# Patient Record
Sex: MALE | Race: WHITE | Employment: PART TIME | URBAN - METROPOLITAN AREA
[De-identification: names, ages, dates, MRNs, and addresses within clinical notes are randomized per-mention and may not be internally consistent; named-entity substitution may affect disease eponyms.]

---

## 2023-06-06 ENCOUNTER — HOSPITAL ENCOUNTER (EMERGENCY)
Facility: HOSPITAL | Age: 33
Discharge: ANOTHER HOSPITAL | End: 2023-06-07
Attending: STUDENT IN AN ORGANIZED HEALTH CARE EDUCATION/TRAINING PROGRAM

## 2023-06-06 ENCOUNTER — APPOINTMENT (OUTPATIENT)
Facility: HOSPITAL | Age: 33
End: 2023-06-06

## 2023-06-06 DIAGNOSIS — R56.9 SEIZURE (HCC): Primary | ICD-10-CM

## 2023-06-06 DIAGNOSIS — R45.851 SUICIDAL IDEATION: ICD-10-CM

## 2023-06-06 DIAGNOSIS — F10.920 ALCOHOLIC INTOXICATION WITHOUT COMPLICATION (HCC): ICD-10-CM

## 2023-06-06 LAB
ALBUMIN SERPL-MCNC: 4.4 G/DL (ref 3.5–5)
ALBUMIN/GLOB SERPL: 1 (ref 1.1–2.2)
ALP SERPL-CCNC: 56 U/L (ref 45–117)
ALT SERPL-CCNC: 87 U/L (ref 12–78)
ANION GAP BLD CALC-SCNC: 7 (ref 10–20)
ANION GAP SERPL CALC-SCNC: 13 MMOL/L (ref 5–15)
AST SERPL-CCNC: 75 U/L (ref 15–37)
BASE EXCESS BLD CALC-SCNC: 0.8 MMOL/L
BASOPHILS # BLD: 0 K/UL (ref 0–0.1)
BASOPHILS NFR BLD: 0 % (ref 0–1)
BILIRUB SERPL-MCNC: 0.6 MG/DL (ref 0.2–1)
BUN SERPL-MCNC: 11 MG/DL (ref 6–20)
BUN/CREAT SERPL: 12 (ref 12–20)
CA-I BLD-MCNC: 1.02 MMOL/L (ref 1.12–1.32)
CALCIUM SERPL-MCNC: 8.8 MG/DL (ref 8.5–10.1)
CHLORIDE BLD-SCNC: 106 MMOL/L (ref 100–108)
CHLORIDE SERPL-SCNC: 102 MMOL/L (ref 97–108)
CO2 BLD-SCNC: 24 MMOL/L (ref 19–24)
CO2 SERPL-SCNC: 24 MMOL/L (ref 21–32)
COMMENT:: NORMAL
CREAT SERPL-MCNC: 0.92 MG/DL (ref 0.7–1.3)
CREAT UR-MCNC: 0.7 MG/DL (ref 0.6–1.3)
DIFFERENTIAL METHOD BLD: ABNORMAL
EOSINOPHIL # BLD: 0.1 K/UL (ref 0–0.4)
EOSINOPHIL NFR BLD: 1 % (ref 0–7)
ERYTHROCYTE [DISTWIDTH] IN BLOOD BY AUTOMATED COUNT: 11.6 % (ref 11.5–14.5)
GLOBULIN SER CALC-MCNC: 4.2 G/DL (ref 2–4)
GLUCOSE BLD STRIP.AUTO-MCNC: 88 MG/DL (ref 74–106)
GLUCOSE SERPL-MCNC: 97 MG/DL (ref 65–100)
HCO3 BLDA-SCNC: 25 MMOL/L
HCT VFR BLD AUTO: 44 % (ref 36.6–50.3)
HGB BLD-MCNC: 15.5 G/DL (ref 12.1–17)
IMM GRANULOCYTES # BLD AUTO: 0 K/UL (ref 0–0.04)
IMM GRANULOCYTES NFR BLD AUTO: 0 % (ref 0–0.5)
LACTATE BLD-SCNC: 3.39 MMOL/L (ref 0.4–2)
LYMPHOCYTES # BLD: 1.6 K/UL (ref 0.8–3.5)
LYMPHOCYTES NFR BLD: 39 % (ref 12–49)
MCH RBC QN AUTO: 32.3 PG (ref 26–34)
MCHC RBC AUTO-ENTMCNC: 35.2 G/DL (ref 30–36.5)
MCV RBC AUTO: 91.7 FL (ref 80–99)
MONOCYTES # BLD: 0.7 K/UL (ref 0–1)
MONOCYTES NFR BLD: 16 % (ref 5–13)
NEUTS SEG # BLD: 1.9 K/UL (ref 1.8–8)
NEUTS SEG NFR BLD: 44 % (ref 32–75)
NRBC # BLD: 0 K/UL (ref 0–0.01)
NRBC BLD-RTO: 0 PER 100 WBC
PCO2 BLDV: 34.9 MMHG (ref 41–51)
PH BLDV: 7.46 (ref 7.32–7.42)
PLATELET # BLD AUTO: 162 K/UL (ref 150–400)
PMV BLD AUTO: 8.2 FL (ref 8.9–12.9)
PO2 BLDV: 65 MMHG (ref 25–40)
POTASSIUM BLD-SCNC: 4.1 MMOL/L (ref 3.5–5.5)
POTASSIUM SERPL-SCNC: 3.5 MMOL/L (ref 3.5–5.1)
PROT SERPL-MCNC: 8.6 G/DL (ref 6.4–8.2)
RBC # BLD AUTO: 4.8 M/UL (ref 4.1–5.7)
SAO2 % BLD: 94 %
SERVICE CMNT-IMP: ABNORMAL
SODIUM BLD-SCNC: 137 MMOL/L (ref 136–145)
SODIUM SERPL-SCNC: 139 MMOL/L (ref 136–145)
SPECIMEN HOLD: NORMAL
SPECIMEN SITE: ABNORMAL
TROPONIN I SERPL HS-MCNC: 9 NG/L (ref 0–76)
WBC # BLD AUTO: 4.2 K/UL (ref 4.1–11.1)

## 2023-06-06 PROCEDURE — 2580000003 HC RX 258: Performed by: STUDENT IN AN ORGANIZED HEALTH CARE EDUCATION/TRAINING PROGRAM

## 2023-06-06 PROCEDURE — 70450 CT HEAD/BRAIN W/O DYE: CPT

## 2023-06-06 PROCEDURE — 82550 ASSAY OF CK (CPK): CPT

## 2023-06-06 PROCEDURE — 96360 HYDRATION IV INFUSION INIT: CPT

## 2023-06-06 PROCEDURE — 84484 ASSAY OF TROPONIN QUANT: CPT

## 2023-06-06 PROCEDURE — 96361 HYDRATE IV INFUSION ADD-ON: CPT

## 2023-06-06 PROCEDURE — 93005 ELECTROCARDIOGRAM TRACING: CPT | Performed by: STUDENT IN AN ORGANIZED HEALTH CARE EDUCATION/TRAINING PROGRAM

## 2023-06-06 PROCEDURE — 99285 EMERGENCY DEPT VISIT HI MDM: CPT

## 2023-06-06 PROCEDURE — 85025 COMPLETE CBC W/AUTO DIFF WBC: CPT

## 2023-06-06 PROCEDURE — 90791 PSYCH DIAGNOSTIC EVALUATION: CPT

## 2023-06-06 PROCEDURE — 82330 ASSAY OF CALCIUM: CPT

## 2023-06-06 PROCEDURE — 84132 ASSAY OF SERUM POTASSIUM: CPT

## 2023-06-06 PROCEDURE — 80053 COMPREHEN METABOLIC PANEL: CPT

## 2023-06-06 PROCEDURE — 36415 COLL VENOUS BLD VENIPUNCTURE: CPT

## 2023-06-06 PROCEDURE — 82803 BLOOD GASES ANY COMBINATION: CPT

## 2023-06-06 PROCEDURE — 82947 ASSAY GLUCOSE BLOOD QUANT: CPT

## 2023-06-06 PROCEDURE — 84295 ASSAY OF SERUM SODIUM: CPT

## 2023-06-06 PROCEDURE — 82077 ASSAY SPEC XCP UR&BREATH IA: CPT

## 2023-06-06 RX ORDER — SODIUM CHLORIDE 0.9 % (FLUSH) 0.9 %
5-40 SYRINGE (ML) INJECTION EVERY 12 HOURS SCHEDULED
Status: DISCONTINUED | OUTPATIENT
Start: 2023-06-06 | End: 2023-06-07 | Stop reason: HOSPADM

## 2023-06-06 RX ORDER — LEVETIRACETAM 500 MG/1
500 TABLET ORAL 2 TIMES DAILY
Status: DISCONTINUED | OUTPATIENT
Start: 2023-06-06 | End: 2023-06-07 | Stop reason: HOSPADM

## 2023-06-06 RX ORDER — SODIUM CHLORIDE 9 MG/ML
INJECTION, SOLUTION INTRAVENOUS PRN
Status: DISCONTINUED | OUTPATIENT
Start: 2023-06-06 | End: 2023-06-07 | Stop reason: HOSPADM

## 2023-06-06 RX ORDER — ACETAMINOPHEN 500 MG
1000 TABLET ORAL
Status: COMPLETED | OUTPATIENT
Start: 2023-06-06 | End: 2023-06-07

## 2023-06-06 RX ORDER — 0.9 % SODIUM CHLORIDE 0.9 %
1000 INTRAVENOUS SOLUTION INTRAVENOUS ONCE
Status: COMPLETED | OUTPATIENT
Start: 2023-06-06 | End: 2023-06-07

## 2023-06-06 RX ORDER — SODIUM CHLORIDE 0.9 % (FLUSH) 0.9 %
5-40 SYRINGE (ML) INJECTION PRN
Status: DISCONTINUED | OUTPATIENT
Start: 2023-06-06 | End: 2023-06-07 | Stop reason: HOSPADM

## 2023-06-06 RX ORDER — IBUPROFEN 600 MG/1
600 TABLET ORAL
Status: COMPLETED | OUTPATIENT
Start: 2023-06-06 | End: 2023-06-07

## 2023-06-06 RX ORDER — LORAZEPAM 1 MG/1
1 TABLET ORAL EVERY 4 HOURS PRN
Status: DISCONTINUED | OUTPATIENT
Start: 2023-06-06 | End: 2023-06-07 | Stop reason: HOSPADM

## 2023-06-06 RX ADMIN — SODIUM CHLORIDE 1000 ML: 9 INJECTION, SOLUTION INTRAVENOUS at 23:39

## 2023-06-07 ENCOUNTER — HOSPITAL ENCOUNTER (INPATIENT)
Facility: HOSPITAL | Age: 33
LOS: 1 days | Discharge: LEFT BEFORE DISCHARGE | DRG: 755 | End: 2023-06-08
Attending: PSYCHIATRY & NEUROLOGY | Admitting: PSYCHIATRY & NEUROLOGY
Payer: MEDICAID

## 2023-06-07 ENCOUNTER — APPOINTMENT (OUTPATIENT)
Facility: HOSPITAL | Age: 33
End: 2023-06-07

## 2023-06-07 VITALS
RESPIRATION RATE: 24 BRPM | HEART RATE: 97 BPM | TEMPERATURE: 98.1 F | OXYGEN SATURATION: 96 % | DIASTOLIC BLOOD PRESSURE: 84 MMHG | WEIGHT: 210 LBS | SYSTOLIC BLOOD PRESSURE: 147 MMHG

## 2023-06-07 PROBLEM — R45.851 SUICIDAL IDEATION: Status: ACTIVE | Noted: 2023-06-07

## 2023-06-07 LAB
AMPHET UR QL SCN: NEGATIVE
APPEARANCE UR: CLEAR
BACTERIA URNS QL MICRO: NEGATIVE /HPF
BARBITURATES UR QL SCN: NEGATIVE
BENZODIAZ UR QL: NEGATIVE
BILIRUB UR QL: NEGATIVE
CANNABINOIDS UR QL SCN: POSITIVE
CK SERPL-CCNC: 406 U/L (ref 39–308)
CK SERPL-CCNC: 505 U/L (ref 39–308)
COCAINE UR QL SCN: NEGATIVE
COLOR UR: ABNORMAL
EKG ATRIAL RATE: 113 BPM
EKG DIAGNOSIS: NORMAL
EKG P AXIS: 42 DEGREES
EKG P-R INTERVAL: 152 MS
EKG Q-T INTERVAL: 336 MS
EKG QRS DURATION: 84 MS
EKG QTC CALCULATION (BAZETT): 460 MS
EKG R AXIS: 58 DEGREES
EKG T AXIS: 34 DEGREES
EKG VENTRICULAR RATE: 113 BPM
EPITH CASTS URNS QL MICRO: ABNORMAL /LPF
ETHANOL SERPL-MCNC: 162 MG/DL (ref 0–0.08)
ETHANOL SERPL-MCNC: 341 MG/DL (ref 0–0.08)
FLUAV RNA SPEC QL NAA+PROBE: NOT DETECTED
FLUBV RNA SPEC QL NAA+PROBE: NOT DETECTED
GLUCOSE UR STRIP.AUTO-MCNC: NEGATIVE MG/DL
HGB UR QL STRIP: NEGATIVE
HYALINE CASTS URNS QL MICRO: ABNORMAL /LPF (ref 0–2)
KETONES UR QL STRIP.AUTO: 40 MG/DL
LACTATE BLD-SCNC: 2.17 MMOL/L (ref 0.4–2)
LEUKOCYTE ESTERASE UR QL STRIP.AUTO: NEGATIVE
Lab: ABNORMAL
METHADONE UR QL: NEGATIVE
NITRITE UR QL STRIP.AUTO: NEGATIVE
OPIATES UR QL: NEGATIVE
PCP UR QL: NEGATIVE
PH UR STRIP: 5.5 (ref 5–8)
PROT UR STRIP-MCNC: 100 MG/DL
RBC #/AREA URNS HPF: ABNORMAL /HPF (ref 0–5)
SARS-COV-2 RNA RESP QL NAA+PROBE: NOT DETECTED
SP GR UR REFRACTOMETRY: 1.03
URINE CULTURE IF INDICATED: ABNORMAL
UROBILINOGEN UR QL STRIP.AUTO: 1 EU/DL (ref 0.2–1)
WBC URNS QL MICRO: ABNORMAL /HPF (ref 0–4)

## 2023-06-07 PROCEDURE — 2580000003 HC RX 258: Performed by: STUDENT IN AN ORGANIZED HEALTH CARE EDUCATION/TRAINING PROGRAM

## 2023-06-07 PROCEDURE — 90715 TDAP VACCINE 7 YRS/> IM: CPT | Performed by: STUDENT IN AN ORGANIZED HEALTH CARE EDUCATION/TRAINING PROGRAM

## 2023-06-07 PROCEDURE — 71046 X-RAY EXAM CHEST 2 VIEWS: CPT

## 2023-06-07 PROCEDURE — 6370000000 HC RX 637 (ALT 250 FOR IP): Performed by: NURSE PRACTITIONER

## 2023-06-07 PROCEDURE — 87636 SARSCOV2 & INF A&B AMP PRB: CPT

## 2023-06-07 PROCEDURE — 6370000000 HC RX 637 (ALT 250 FOR IP): Performed by: STUDENT IN AN ORGANIZED HEALTH CARE EDUCATION/TRAINING PROGRAM

## 2023-06-07 PROCEDURE — 82077 ASSAY SPEC XCP UR&BREATH IA: CPT

## 2023-06-07 PROCEDURE — 83605 ASSAY OF LACTIC ACID: CPT

## 2023-06-07 PROCEDURE — 96361 HYDRATE IV INFUSION ADD-ON: CPT

## 2023-06-07 PROCEDURE — 81001 URINALYSIS AUTO W/SCOPE: CPT

## 2023-06-07 PROCEDURE — 80307 DRUG TEST PRSMV CHEM ANLYZR: CPT

## 2023-06-07 PROCEDURE — 90471 IMMUNIZATION ADMIN: CPT | Performed by: STUDENT IN AN ORGANIZED HEALTH CARE EDUCATION/TRAINING PROGRAM

## 2023-06-07 PROCEDURE — 36415 COLL VENOUS BLD VENIPUNCTURE: CPT

## 2023-06-07 PROCEDURE — 1240000000 HC EMOTIONAL WELLNESS R&B

## 2023-06-07 PROCEDURE — 6360000002 HC RX W HCPCS: Performed by: STUDENT IN AN ORGANIZED HEALTH CARE EDUCATION/TRAINING PROGRAM

## 2023-06-07 RX ORDER — MAGNESIUM HYDROXIDE/ALUMINUM HYDROXICE/SIMETHICONE 120; 1200; 1200 MG/30ML; MG/30ML; MG/30ML
30 SUSPENSION ORAL EVERY 6 HOURS PRN
Status: DISCONTINUED | OUTPATIENT
Start: 2023-06-07 | End: 2023-06-08 | Stop reason: HOSPADM

## 2023-06-07 RX ORDER — ACETAMINOPHEN 325 MG/1
650 TABLET ORAL EVERY 4 HOURS PRN
Status: DISCONTINUED | OUTPATIENT
Start: 2023-06-07 | End: 2023-06-08 | Stop reason: HOSPADM

## 2023-06-07 RX ORDER — ESCITALOPRAM OXALATE 20 MG/1
10 TABLET ORAL DAILY
COMMUNITY

## 2023-06-07 RX ORDER — HALOPERIDOL 5 MG/ML
5 INJECTION INTRAMUSCULAR EVERY 4 HOURS PRN
Status: DISCONTINUED | OUTPATIENT
Start: 2023-06-07 | End: 2023-06-08 | Stop reason: HOSPADM

## 2023-06-07 RX ORDER — HALOPERIDOL 5 MG/1
5 TABLET ORAL EVERY 4 HOURS PRN
Status: DISCONTINUED | OUTPATIENT
Start: 2023-06-07 | End: 2023-06-08 | Stop reason: HOSPADM

## 2023-06-07 RX ORDER — SENNA PLUS 8.6 MG/1
1 TABLET ORAL DAILY PRN
Status: DISCONTINUED | OUTPATIENT
Start: 2023-06-07 | End: 2023-06-08 | Stop reason: HOSPADM

## 2023-06-07 RX ORDER — PHENOBARBITAL 64.8 MG/1
64.8 TABLET ORAL EVERY 6 HOURS PRN
Status: ACTIVE | OUTPATIENT
Start: 2023-06-07 | End: 2023-06-08

## 2023-06-07 RX ORDER — PHENOBARBITAL 32.4 MG/1
16.2 TABLET ORAL 2 TIMES DAILY
Status: DISCONTINUED | OUTPATIENT
Start: 2023-06-10 | End: 2023-06-08 | Stop reason: HOSPADM

## 2023-06-07 RX ORDER — PHENOBARBITAL 32.4 MG/1
32.4 TABLET ORAL EVERY 6 HOURS PRN
Status: DISCONTINUED | OUTPATIENT
Start: 2023-06-08 | End: 2023-06-08 | Stop reason: HOSPADM

## 2023-06-07 RX ORDER — POLYETHYLENE GLYCOL 3350 17 G
2 POWDER IN PACKET (EA) ORAL
Status: DISCONTINUED | OUTPATIENT
Start: 2023-06-07 | End: 2023-06-08 | Stop reason: HOSPADM

## 2023-06-07 RX ORDER — LEVETIRACETAM 500 MG/1
500 TABLET ORAL 2 TIMES DAILY
COMMUNITY

## 2023-06-07 RX ORDER — POLYETHYLENE GLYCOL 3350 17 G/17G
17 POWDER, FOR SOLUTION ORAL DAILY PRN
Status: DISCONTINUED | OUTPATIENT
Start: 2023-06-07 | End: 2023-06-08 | Stop reason: HOSPADM

## 2023-06-07 RX ORDER — PHENOBARBITAL 64.8 MG/1
64.8 TABLET ORAL 4 TIMES DAILY
Status: COMPLETED | OUTPATIENT
Start: 2023-06-07 | End: 2023-06-08

## 2023-06-07 RX ORDER — HYDROXYZINE 50 MG/1
50 TABLET, FILM COATED ORAL 3 TIMES DAILY PRN
Status: DISCONTINUED | OUTPATIENT
Start: 2023-06-07 | End: 2023-06-08 | Stop reason: HOSPADM

## 2023-06-07 RX ORDER — IBUPROFEN 600 MG/1
600 TABLET ORAL
Status: COMPLETED | OUTPATIENT
Start: 2023-06-07 | End: 2023-06-07

## 2023-06-07 RX ORDER — MULTIVITAMIN WITH IRON
1 TABLET ORAL DAILY
Status: DISCONTINUED | OUTPATIENT
Start: 2023-06-07 | End: 2023-06-08 | Stop reason: HOSPADM

## 2023-06-07 RX ORDER — PHENOBARBITAL 32.4 MG/1
16.2 TABLET ORAL EVERY 6 HOURS PRN
Status: DISCONTINUED | OUTPATIENT
Start: 2023-06-10 | End: 2023-06-08 | Stop reason: HOSPADM

## 2023-06-07 RX ORDER — LEVETIRACETAM 500 MG/1
500 TABLET ORAL 2 TIMES DAILY
Status: DISCONTINUED | OUTPATIENT
Start: 2023-06-07 | End: 2023-06-08 | Stop reason: HOSPADM

## 2023-06-07 RX ORDER — PHENOBARBITAL 32.4 MG/1
32.4 TABLET ORAL 4 TIMES DAILY
Status: DISCONTINUED | OUTPATIENT
Start: 2023-06-08 | End: 2023-06-08 | Stop reason: HOSPADM

## 2023-06-07 RX ORDER — CHLORDIAZEPOXIDE HYDROCHLORIDE 25 MG/1
25 CAPSULE, GELATIN COATED ORAL EVERY 6 HOURS PRN
Status: CANCELLED | OUTPATIENT
Start: 2023-06-07

## 2023-06-07 RX ORDER — FOLIC ACID 1 MG/1
1 TABLET ORAL DAILY
Status: DISCONTINUED | OUTPATIENT
Start: 2023-06-07 | End: 2023-06-08

## 2023-06-07 RX ORDER — ESCITALOPRAM OXALATE 10 MG/1
20 TABLET ORAL DAILY
Status: DISCONTINUED | OUTPATIENT
Start: 2023-06-07 | End: 2023-06-08

## 2023-06-07 RX ORDER — LANOLIN ALCOHOL/MO/W.PET/CERES
100 CREAM (GRAM) TOPICAL DAILY
Status: DISCONTINUED | OUTPATIENT
Start: 2023-06-07 | End: 2023-06-08

## 2023-06-07 RX ORDER — CLONIDINE HYDROCHLORIDE 0.1 MG/1
0.1 TABLET ORAL EVERY 6 HOURS PRN
Status: DISCONTINUED | OUTPATIENT
Start: 2023-06-07 | End: 2023-06-08 | Stop reason: HOSPADM

## 2023-06-07 RX ORDER — PHENOBARBITAL 32.4 MG/1
32.4 TABLET ORAL 2 TIMES DAILY
Status: DISCONTINUED | OUTPATIENT
Start: 2023-06-09 | End: 2023-06-08 | Stop reason: HOSPADM

## 2023-06-07 RX ORDER — 0.9 % SODIUM CHLORIDE 0.9 %
1000 INTRAVENOUS SOLUTION INTRAVENOUS ONCE
Status: COMPLETED | OUTPATIENT
Start: 2023-06-07 | End: 2023-06-07

## 2023-06-07 RX ORDER — DIPHENHYDRAMINE HYDROCHLORIDE 50 MG/ML
50 INJECTION INTRAMUSCULAR; INTRAVENOUS EVERY 4 HOURS PRN
Status: DISCONTINUED | OUTPATIENT
Start: 2023-06-07 | End: 2023-06-08 | Stop reason: HOSPADM

## 2023-06-07 RX ORDER — TRAZODONE HYDROCHLORIDE 50 MG/1
50 TABLET ORAL NIGHTLY PRN
Status: DISCONTINUED | OUTPATIENT
Start: 2023-06-07 | End: 2023-06-08 | Stop reason: HOSPADM

## 2023-06-07 RX ADMIN — TRAZODONE HYDROCHLORIDE 50 MG: 50 TABLET ORAL at 21:22

## 2023-06-07 RX ADMIN — ACETAMINOPHEN 1000 MG: 500 TABLET ORAL at 00:41

## 2023-06-07 RX ADMIN — PHENOBARBITAL 64.8 MG: 64.8 TABLET ORAL at 21:00

## 2023-06-07 RX ADMIN — ESCITALOPRAM OXALATE 20 MG: 10 TABLET ORAL at 17:14

## 2023-06-07 RX ADMIN — IBUPROFEN 600 MG: 600 TABLET, FILM COATED ORAL at 00:41

## 2023-06-07 RX ADMIN — LEVETIRACETAM 500 MG: 500 TABLET, FILM COATED ORAL at 21:22

## 2023-06-07 RX ADMIN — LEVETIRACETAM 500 MG: 500 TABLET, FILM COATED ORAL at 09:23

## 2023-06-07 RX ADMIN — SODIUM CHLORIDE 1000 ML: 9 INJECTION, SOLUTION INTRAVENOUS at 03:00

## 2023-06-07 RX ADMIN — LEVETIRACETAM 500 MG: 500 TABLET, FILM COATED ORAL at 00:41

## 2023-06-07 RX ADMIN — TETANUS TOXOID, REDUCED DIPHTHERIA TOXOID AND ACELLULAR PERTUSSIS VACCINE, ADSORBED 0.5 ML: 5; 2.5; 8; 8; 2.5 SUSPENSION INTRAMUSCULAR at 00:41

## 2023-06-07 RX ADMIN — Medication 100 MG: at 17:14

## 2023-06-07 RX ADMIN — FOLIC ACID 1 MG: 1 TABLET ORAL at 17:14

## 2023-06-07 RX ADMIN — IBUPROFEN 600 MG: 600 TABLET, FILM COATED ORAL at 09:23

## 2023-06-07 RX ADMIN — THERA TABS 1 TABLET: TAB at 17:14

## 2023-06-07 RX ADMIN — PHENOBARBITAL 64.8 MG: 64.8 TABLET ORAL at 17:14

## 2023-06-07 ASSESSMENT — SLEEP AND FATIGUE QUESTIONNAIRES
AVERAGE NUMBER OF SLEEP HOURS: 8
DO YOU HAVE DIFFICULTY SLEEPING: NO
DO YOU USE A SLEEP AID: NO

## 2023-06-07 ASSESSMENT — LIFESTYLE VARIABLES
HOW MANY STANDARD DRINKS CONTAINING ALCOHOL DO YOU HAVE ON A TYPICAL DAY: 10 OR MORE
HOW OFTEN DO YOU HAVE A DRINK CONTAINING ALCOHOL: 4 OR MORE TIMES A WEEK

## 2023-06-07 ASSESSMENT — PAIN - FUNCTIONAL ASSESSMENT: PAIN_FUNCTIONAL_ASSESSMENT: NONE - DENIES PAIN

## 2023-06-07 NOTE — BSMART NOTE
Access is aware that patient will need placement when he medically clears.
Attending nurse Felisa; reported patient wanting to speak with this writer about being admitted at time due to him not feel safe with self.
BSMART assessment completed, and suicide risk level noted to be low risk. Primary Nurse Avila Peck and Charge Nurse  and Physician Noel notified. Concerns not observed. Security/Off- has not been notified.
Patient has been accepted to [place] [facility] [address] room/bed [address] by Sai Bee NP on behalf of Dr. Huey Galvan. RN notified to call report [phone].      Elijah Oseguera LCSW
suicidal thoughts as reported he feels like a burden, as reported he wishes he would disappear. Patient reported trying not to think about suicidal thoughts as reported he knows when goes back to woods it will be on his mind. Patient denied any attempts or plans present or past. Patient denied homicidal thoughts and hallucinations. Patient reported being homeless since February and reported over the past month his drinking has increased as reported 1-2 bottles of wine as reported daily. Patient reported tremors in the past. Patient denied illicit substance use. Patient is not seeking an admission at this time. This writer reviewed with patient what an admission consists of and he stated he would like resources. Patient expressed concern about withdraws and him not having any where to go. Patient reported he feel safe with himself in hospital as he was concerned about being discharged due to possible withdrawals, seizures and no where to go. Attending nurse informed this writer that patient's mother and brother have been calling ED inquiring about him and they stated he has been verbalizing suicidal thoughts today. Attending nurse reported he would not give her permission to speak with family. 2:40am Patient reported he does not feel safe with self, as reported ED provider informed him no medical reason to admit him. Patient informed attending MD if there was a pill he could take he would. 2:45am: Patient expressed that he is freaking out in his head, as reported thoughts have not gone away. As reported he does not have plan and not going to do anything because he does not know how to but he feels he needs mental health treatment to deal with anxiety, depression as reported may a new medication. Patient requesting an admission at this time. The patient has demonstrated mental capacity to provide informed consent. The information is given by the patient.   The Chief Complaint is seizures, passive

## 2023-06-07 NOTE — H&P
all available resources at the time of admission. Route is PO if not otherwise noted. Family and social history were personally reviewed, all pertinent and relevant details are outlined as above. Objective:   BP (!) 143/86   Pulse 69   Temp 97.9 °F (36.6 °C) (Oral)   Resp 16   SpO2 99%         PHYSICAL EXAM:   General: Alert x oriented x 3, awake, no acute distress,   HEENT: PEERL, EOMI, moist mucus membranes  Neck: Supple, no JVD, no meningeal signs  Chest: Clear to auscultation bilaterally   CVS: RRR, S1 S2 heard, no murmurs/rubs/gallops  Abd: Soft, non-tender, non-distended, +bowel sounds   Ext: No clubbing, no cyanosis, no edema  Neuro/Psych: Pleasant mood and affect, CN 2-12 grossly intact, sensory grossly within normal limit, Strength 5/5 in all extremities, DTR 1+ x 4  Cap refill: Brisk, less than 3 seconds  Pulses: 2+, symmetric in all extremities  Skin: Warm, dry, without rashes or lesions. Bilateral knee abrasions     Data Review:   I have independently reviewed and interpreted patient's lab and all other diagnostic data    Abnormal Labs Reviewed - No data to display    [unfilled]    IMAGING:   No orders to display        ECG/ECHO:  [unfilled]       Notes reviewed from all clinical/nonclinical/nursing services involved in patient's clinical care. Care coordination discussions were held with appropriate clinical/nonclinical/ nursing providers based on care coordination needs. Assessment:   Given the patient's current clinical presentation, there is a high level of concern for decompensation if discharged from the emergency department. Complex decision making was performed, which includes reviewing the patient's available past medical records, laboratory results, and imaging studies. Principal Problem:    Suicidal ideation  Resolved Problems:    * No resolved hospital problems.  *      Plan:     Seizure disorder   - Secondary to head trauma in 2016   - Lactic acid elevated, likely

## 2023-06-07 NOTE — ED PROVIDER NOTES
feel that neck imaging is indicated. We will plan on labs, imaging, pain control and reevaluation for final disposition. Amount and/or Complexity of Data Reviewed  Clinical lab tests: reviewed  Tests in the radiology section of CPT®: reviewed  Tests in the medicine section of CPT®: reviewed        ED Course as of 06/07/23 0304   Wed Jun 07, 2023   0301 Patient had no more seizures while in the ED, initial lactic elevated, likely secondary to seizure, will repeat at this time after fluids, patient also found to have significantly elevated EtOH, patient speaking coherently, no apparent distress, do not feel that he is clinically intoxicated, discussed case with be eveline who agrees he appears passive but would offer voluntary admission for concerns at this time, will repeat lactic at this time, provide additional fluids and plan for behavioral health admission for suicidal ideations [RN]      ED Course User Index  [RN] Fernando Silveira MD       Vitals:    06/06/23 2144   BP: (!) 158/103   Pulse: (!) 110   Resp: 13   Temp: 98.4 °F (36.9 °C)   SpO2: 95%        Patient was given the following medications:  Previous Medications    No medications on file       CONSULTS:  IP CONSULT TO SOCIAL WORK  IP CONSULT TO BSMART    Social Determinants affecting Dx or Tx:   Records Reviewed (source and summary of external notes):   DIAGNOSTIC RESULTS   LABS:  Recent Results (from the past 24 hour(s))   EKG 12 Lead    Collection Time: 06/06/23  9:50 PM   Result Value Ref Range    Ventricular Rate 113 BPM    Atrial Rate 113 BPM    P-R Interval 152 ms    QRS Duration 84 ms    Q-T Interval 336 ms    QTc Calculation (Bazett) 460 ms    P Axis 42 degrees    R Axis 58 degrees    T Axis 34 degrees    Diagnosis Sinus tachycardia  No previous ECGs available      CBC with Auto Differential    Collection Time: 06/06/23  9:56 PM   Result Value Ref Range    WBC 4.2 4.1 - 11.1 K/uL    RBC 4.80 4. 10 - 5.70 M/uL    Hemoglobin 15.5 12.1 - 17.0 g/dL

## 2023-06-07 NOTE — PROGRESS NOTES
Pharmacy Medication Reconciliation     The patient was interviewed regarding current PTA medication list, use and drug allergies. The patient was questioned regarding use of any other inhalers, topical products, over the counter medications, herbal medications, vitamin products or ophthalmic/nasal/otic medication use. Allergy Update: Patient has no known allergies. Recommendations/Findings: The following amendments were made to the patient's active medication list on file at [facility]:   1) Additions: escitalopram and levetiracetam    2) Deletions: none    3) Changes: none      Pertinent Findings: Patient had his prescription bottles at bedside and was able to approximate his last doses being 2-3 days ago. He reports only taking levetiracetam once daily, but is aware that it was written for BID. Reports insomnia. According to [facility], he was previously prescribed zolpidem 5 mg QHS, and recognized the name, but did not recall if it worked for him. He reports not having a current residence and does have a preferred pharmacy other than \"[facility]\". Clarified PTA med list with patient. PTA medication list was corrected to the following:     Prior to Admission Medications   Prescriptions Last Dose Informant Patient Reported?  Taking?   escitalopram (LEXAPRO) 20 MG tablet Past Week Self Yes Yes   Sig: Take 1 tablet by mouth daily   levETIRAcetam (KEPPRA) 500 MG tablet Past Week Self Yes Yes   Sig: Take 1 tablet by mouth daily      Facility-Administered Medications: None          Thank you,  IMMANUEL CAMILO, [facility]

## 2023-06-07 NOTE — ED NOTES
2215: I spoke with pts brother Maral Bhatt on the phone as he called the nurses station asking about this patient specifically. Per pts request, I did not tell Maral Bhatt anything. I said \"I cannot release any information to you, including whether or not this patient is here\". I then explained to pt that his brother called, and that no information was given. 0015: Pts mother called, and I spoke with her on the phone. I told her \"I cannot release any information to you, including whether or not this patient is here\". I told pt his mother called inquiring about him. He continues to deny wanting to speak with his family, and he does not want them to know he is here.       Chalino Carvalho RN  06/07/23 7447

## 2023-06-07 NOTE — ED NOTES
0715: Assumed care of patient from [place], RN at this time. Patient resting in stretcher.  at bedside with seizure pads on stretcher. Patient remains on monitor x 3 due to medical necessity and CIWA/seizure monitoring. Patient scoring low risk on SI re-eval this morning however reports that he does not feel safe with his own thoughts to be released from hospital. Patient  remains at bedside per [facility] recommendation. 0845: Psychiatry consulted called at this time     0915: Patient given meal tray at this time. Asking for pain medication. Provider made aware. 1017: Spoke to Elijah from [facility] at this time. She reports that [facility] should be en route to see patient in person in ED. She is going to follow up with bed board now on status. 1215: Report given to Christen Smith RN at [facility] on [facility] unit. [facility] transport arranged at this time. ETA 1330    1430: [facility] at bedside to be transported to [facility] at this time. Patient now in the care of [facility].          [facility]  06/07/23 8426

## 2023-06-07 NOTE — ED NOTES
Call completed from Sandra with [facility]. Patient needs additional medical clearance. Patient's diastolic blood pressure needs to be less thank 100. Patient needs to be started on CIWA. Patient's blood alcohol level must be under 200. Patient's CK can be re-checked or MD can explain why there is no reason to re-check and that the patient is stable/medically cleared. Patient pulse needs to be \"controlled\" less than 100. Message forwarded to MD [facility].       Cooper Smiley RN  06/07/23 4331

## 2023-06-08 VITALS
HEART RATE: 64 BPM | TEMPERATURE: 97.9 F | OXYGEN SATURATION: 96 % | SYSTOLIC BLOOD PRESSURE: 113 MMHG | DIASTOLIC BLOOD PRESSURE: 74 MMHG | RESPIRATION RATE: 16 BRPM

## 2023-06-08 PROCEDURE — 6370000000 HC RX 637 (ALT 250 FOR IP): Performed by: NURSE PRACTITIONER

## 2023-06-08 RX ORDER — LEVETIRACETAM 500 MG/1
500 TABLET ORAL 2 TIMES DAILY
Qty: 60 TABLET | Refills: 0 | Status: SHIPPED | OUTPATIENT
Start: 2023-06-08

## 2023-06-08 RX ORDER — ESCITALOPRAM OXALATE 10 MG/1
10 TABLET ORAL DAILY
Status: DISCONTINUED | OUTPATIENT
Start: 2023-06-09 | End: 2023-06-08 | Stop reason: HOSPADM

## 2023-06-08 RX ADMIN — Medication 100 MG: at 09:09

## 2023-06-08 RX ADMIN — PHENOBARBITAL 64.8 MG: 64.8 TABLET ORAL at 13:15

## 2023-06-08 RX ADMIN — FOLIC ACID 1 MG: 1 TABLET ORAL at 09:09

## 2023-06-08 RX ADMIN — ESCITALOPRAM OXALATE 20 MG: 10 TABLET ORAL at 09:09

## 2023-06-08 RX ADMIN — PHENOBARBITAL 64.8 MG: 64.8 TABLET ORAL at 09:09

## 2023-06-08 RX ADMIN — LEVETIRACETAM 500 MG: 500 TABLET, FILM COATED ORAL at 09:10

## 2023-06-08 RX ADMIN — THERA TABS 1 TABLET: TAB at 09:09

## 2023-06-08 ASSESSMENT — LIFESTYLE VARIABLES
HOW OFTEN DO YOU HAVE A DRINK CONTAINING ALCOHOL: 4 OR MORE TIMES A WEEK
HOW MANY STANDARD DRINKS CONTAINING ALCOHOL DO YOU HAVE ON A TYPICAL DAY: 5 OR 6

## 2023-06-08 NOTE — PLAN OF CARE
Problem: Discharge Planning  Goal: Discharge to home or other facility with appropriate resources  Outcome: Progressing   15:30 pt pleasant and cooperative  No voiced complaints or acute distress at present  Pt awaiting discharge
Problem: Safety - Adult  Goal: Free from fall injury  Outcome: Progressing   Patient received, appears to be asleep, eyes closed, breathing even and unlabored. No signs of distress noted. Will continue to monitor for safety.
groups to build coping skills 60 7 1 Eleonora Cabrera MSW   Psychoeducation in group setting to address:   Medication education   Jose Luis Ocampo 43. PharmD   Coping skills   [facility]   Relaxation techniques         Symptom management         Discharge planning   60 2 [facility]   Spirituality    60 2 1 [facility]plain [facility]   61 1 1 volunteer   Recovery/AA/NA      volunteer   Physician medication management   15 7 [phone] Albertina Hill NP   Family meeting/discharge planning   15 2 1400 [facility]

## 2023-06-08 NOTE — H&P
process: logical and goal directed, linear   Thought content: denies SI/plan/intent, denies HI/plan/intent  Sensorium: Alert, awake and oriented X 4  Attention/Concentration: Intact  Insight: fair  Judgment: fair     DIAGNOSTIC IMPRESSION:  Adjustment disorder with mixed anxiety and depressed mood    PLAN:   Continue inpatient stay for the safety and optimum care of the patient. Routine labs to be ordered as needed. Repeat CK and lactic acid tomorrow morning to follow up on abnormal results from yesterday. Psychotropic medications will be ordered and adjusted as needed. Restarting Lexapro 10mg daily, no other changes at this time. Supportive, milieu and group therapy. Collateral and care coordination with family members and outpatient team.   Estimated length of stay is 5-7 days, dependent upon pt's participation in treatment, response to pharmacological and non-pharmacological interventions, and follow-up plan including outpatient providers and safe environment for discharge. I certify that this patient's inpatient psychiatric hospital admission is medically necessary for treatment which could reasonably be expected to improve this patient's condition. The inpatient psychiatric services are provided while the individual is under the care of a psychiatric provider and are included in the individualized plan of care.

## 2023-06-08 NOTE — BH NOTE
Pt arrived via transport  Pt is voluntary admission   Pt did consent to safety while on the unit   Pt did sign consent forms up on arriving   Denies suicidal ideation   Denies homicidal ideation   Does not appear to be responding to internal stimuli        Pt was calm and cooperative, gave minimal answers during admission process.  Will continue to monitor and support q15min
Skin Assessment performed by: TN, RN and BELLA GUZMAN        Jewelry:   Safety: 15 mins safety     Pressure Ulcer Documentation  (COMPLETE ONE LABEL PER PRESSURE ULCER)  For further information, please review corresponding Wound Care flowsheet.       Otto Simons has:no pressure ulcer        Olman Lopez RN
skills: Limited, ineffective    Feliberto Pittman [facility]  6/7/2023
they secured? None    Is patient aware of and in agreement with discharge plan? Yes    Arrangements for medication:  No prescriptions provided    Copy of discharge instructions to provider?:  Yes    Arrangements for transportation home: [place]    Keep all follow up appointments as scheduled, continue to take prescribed medications per physician instructions. Mental health crisis number:  797 or your local mental health crisis line number at [address] [place] [address] at [phone]    Discharge Medication List and Instructions:      Medication List        CHANGE how you take these medications      * levETIRAcetam 500 MG tablet  Commonly known as: KEPPRA  What changed: Another medication with the same name was added. Make sure you understand how and when to take each. * levETIRAcetam 500 MG tablet  Commonly known as: KEPPRA  Take 1 tablet by mouth 2 times daily  What changed: You were already taking a medication with the same name, and this prescription was added. Make sure you understand how and when to take each. * This list has 2 medication(s) that are the same as other medications prescribed for you. Read the directions carefully, and ask your doctor or other care provider to review them with you. CONTINUE taking these medications      escitalopram 20 MG tablet  Commonly known as: Jane Marker               Where to Get Your Medications        These medications were sent to [facility] [phone] [facility] [phone]  [address] [place] [address], [address] [place] [address]      Phone: [phone]   levETIRAcetam 500 MG tablet           To obtain results of studies pending at discharge, please contact     Follow-up Information       Follow up With Specialties Details Why [address] [place], [place] [address]vd  Call They will call this evening to complete your intake assessment, plan to meet with counselor tomorrow afternoon for community stabilization services.  The goals are to
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
## 2023-06-08 NOTE — WOUND CARE
with the skin around it, you can treat it the same way as the others (with xeroform), right now we are trying to get the tissue to dry some and flatten down as it has grown up past the level of the skin]     Discussed assessment and plan with RN, Michael Martinez.     Transition of Care: Plan to follow weekly and as needed while admitted to hospital.     Patricia Hearn  MSN, RN  [facility] Inpatient [address]\""   Available through [address] [place] [address]

## 2023-06-08 NOTE — DISCHARGE INSTRUCTIONS
DISCHARGE SUMMARY    Nilo Jason  : 1990  MRN: [id number]    The patient Earlene Book exhibits the ability to control behavior in a less restrictive environment. Patient's level of functioning is improving. No assaultive/destructive behavior has been observed for the past 24 hours. No suicidal/homicidal threat or behavior has been observed for the past 24 hours. There is no evidence of serious medication side effects. Patient has not been in physical or protective restraints for at least the past 24 hours. If weapons involved, how are they secured? None    Is patient aware of and in agreement with discharge plan? Yes    Arrangements for medication:  No prescriptions provided    Copy of discharge instructions to provider?:  Yes    Arrangements for transportation home: [place]    Keep all follow up appointments as scheduled, continue to take prescribed medications per physician instructions. Mental health crisis number:  536 or your local mental health crisis line number at [address] [place] [address] at [place] [address] Emergency WARM LINE      [phone])      M-F: 9am to 9pm      Sat & Sun: [facility] suicide prevention lines:                             5-209-[phone] ([phone])       [phone] ([phone])    Crisis Text Line:  Text HOME to Tommy Jama from Nurse    PATIENT INSTRUCTIONS:      What to do at Home:  Recommended activity: activity as tolerated,     If you experience any of the following symptoms thoughts of harming self - call 911    *  Please give a list of your current medications to your Primary Care Provider. *  Please update this list whenever your medications are discontinued, doses are      changed, or new medications (including over-the-counter products) are added. *  Please carry medication information at all times in case of emergency situations.     These are general instructions for a

## 2023-06-08 NOTE — INTERDISCIPLINARY ROUNDS
Behavioral Health Interdisciplinary Rounds     Patient Name: Rosey Chester  Age: 34 y.o. Room/Bed:  [facility]/  Primary Diagnosis: Suicidal ideation   Admission Status: Voluntary    Readmission within 30 days: No  Power of  in place: No  Patient requires a blocked bed: No          Reason for blocked bed:   Sleep hours: 7       Participation in Care/Groups:  N/a  Medication Compliant?: Yes  PRNS (last 24 hours): Sleep Aid   Restraints (last 24 hours):  No  __________________________________________________  OQ Admission Analysis Survey completed:  OQ Admission Analysis Survey score:  __________________________________________________     Alcohol screening (AUDIT) completed -     If applicable, date SBIRT discussed in treatment team AND documented:    Tobacco - patient is a smoker:    Illegal Drugs use:      24 hour chart check complete: Yes    _______________________________________________    Patient goal(s) for today:   Treatment team focus/goals:   Progress note: Patient met with treatment team for the first time since admission and appeared with a calm and pleasant mood and affect. Patient requesting CSU services due to homelessness. SW to coordinate, plan to discharge tomorrow.      Spiritual Care Consult:   Financial concerns/prescription coverage:    Family contact:                        Family requesting physician contact today:    Discharge plan:   Access to weapons :                                                              Outpatient provider(s):   Patient's preferred phone number for follow up call :   Patient's preferred e-mail address :    LOS:  1  Expected LOS: 2-4    Participating treatment team members: Rosey ChesterSamuel MSW, Arsenio Patel, NAYELI, Mamadou Yanes NP, Ashley ModiD